# Patient Record
Sex: FEMALE | Race: OTHER
[De-identification: names, ages, dates, MRNs, and addresses within clinical notes are randomized per-mention and may not be internally consistent; named-entity substitution may affect disease eponyms.]

---

## 2020-09-14 ENCOUNTER — HOSPITAL ENCOUNTER (OUTPATIENT)
Dept: HOSPITAL 72 - PAN | Age: 55
Discharge: HOME | End: 2020-09-14
Payer: MEDICARE

## 2020-09-14 VITALS — WEIGHT: 232 LBS | BODY MASS INDEX: 39.61 KG/M2 | HEIGHT: 64 IN

## 2020-09-14 VITALS — SYSTOLIC BLOOD PRESSURE: 102 MMHG | DIASTOLIC BLOOD PRESSURE: 65 MMHG

## 2020-09-14 DIAGNOSIS — Z90.49: ICD-10-CM

## 2020-09-14 DIAGNOSIS — I10: ICD-10-CM

## 2020-09-14 DIAGNOSIS — Z86.010: ICD-10-CM

## 2020-09-14 DIAGNOSIS — F41.9: ICD-10-CM

## 2020-09-14 DIAGNOSIS — E78.00: ICD-10-CM

## 2020-09-14 DIAGNOSIS — F32.9: ICD-10-CM

## 2020-09-14 DIAGNOSIS — R19.7: Primary | ICD-10-CM

## 2020-09-15 NOTE — CONSULTATION
DATE OF CONSULTATION:  09/14/2020

CONSULTING PHYSICIAN:  Nahid Sy MD.



CHIEF COMPLAINT:  Diarrhea.



PAST MEDICAL HISTORY:

1. Diarrhea.

2. Depression.

3. Anxiety.

4. Hypertension.

5. Hypercholesterolemia.

6. History of colonic polyps.



PAST SURGICAL HISTORY:  History of cholecystectomy and tubal ligation.



MEDICATIONS:  Please see medication reconciliation list.



FAMILY HISTORY:  Significant for diabetes.



SOCIAL HISTORY:  The patient denies any tobacco, alcohol, or drug abuse.



ALLERGIES:  No known allergies.



REVIEW OF SYSTEMS:  Positive for diarrhea.



PHYSICAL EXAMINATION:

VITAL SIGNS:  Temperature 97.9, blood pressure 102/65, pulse 92,

respirations 20.

HEENT:  Normocephalic and atraumatic.  Sclerae are anicteric.

NECK:  Supple.  No evidence of obvious lymphadenopathy.

CARDIOVASCULAR:  Regular rate and rhythm.  Plus S1 and S2.

LUNGS:  Clear to auscultation bilaterally.

ABDOMEN:  Positive bowel sounds.  Soft and nontender.  No rebound.  No

guarding.  No peritoneal sign.

EXTREMITIES:  No cyanosis, no clubbing, no edema.



ASSESSMENT AND PLAN:  This is a 55-year-old female with on and off

diarrhea.  According to her, she had a colonoscopy done about a year ago.

She does not know the results, but she was told that she might have some

polyps.  Our plan is to do a workup for diarrhea.  The patient was told to

stay away from anything with lactose.  We ordered celiac panel today.

Send stool for O and P.  The patient to come back in two weeks for

followup.









  ______________________________________________

  Nahid Sy M.D.





DR:  NINO

D:  09/14/2020 13:27

T:  09/14/2020 13:58

JOB#:  [id number]

CC: